# Patient Record
Sex: MALE | Race: WHITE | NOT HISPANIC OR LATINO | URBAN - METROPOLITAN AREA
[De-identification: names, ages, dates, MRNs, and addresses within clinical notes are randomized per-mention and may not be internally consistent; named-entity substitution may affect disease eponyms.]

---

## 2021-06-23 ENCOUNTER — APPOINTMENT (RX ONLY)
Dept: URBAN - METROPOLITAN AREA CLINIC 347 | Facility: CLINIC | Age: 41
Setting detail: DERMATOLOGY
End: 2021-06-23

## 2021-06-23 DIAGNOSIS — L81.4 OTHER MELANIN HYPERPIGMENTATION: ICD-10-CM

## 2021-06-23 DIAGNOSIS — D485 NEOPLASM OF UNCERTAIN BEHAVIOR OF SKIN: ICD-10-CM

## 2021-06-23 DIAGNOSIS — B35.1 TINEA UNGUIUM: ICD-10-CM

## 2021-06-23 DIAGNOSIS — D22 MELANOCYTIC NEVI: ICD-10-CM

## 2021-06-23 DIAGNOSIS — L82.1 OTHER SEBORRHEIC KERATOSIS: ICD-10-CM

## 2021-06-23 DIAGNOSIS — B35.3 TINEA PEDIS: ICD-10-CM

## 2021-06-23 DIAGNOSIS — L65.9 NONSCARRING HAIR LOSS, UNSPECIFIED: ICD-10-CM

## 2021-06-23 PROBLEM — D22.5 MELANOCYTIC NEVI OF TRUNK: Status: ACTIVE | Noted: 2021-06-23

## 2021-06-23 PROBLEM — D48.5 NEOPLASM OF UNCERTAIN BEHAVIOR OF SKIN: Status: ACTIVE | Noted: 2021-06-23

## 2021-06-23 PROCEDURE — ? COUNSELING

## 2021-06-23 PROCEDURE — ? PRESCRIPTION

## 2021-06-23 PROCEDURE — 99203 OFFICE O/P NEW LOW 30 MIN: CPT | Mod: 25

## 2021-06-23 PROCEDURE — ? TREATMENT REGIMEN

## 2021-06-23 PROCEDURE — ? OTHER

## 2021-06-23 PROCEDURE — 11102 TANGNTL BX SKIN SINGLE LES: CPT

## 2021-06-23 PROCEDURE — ? BIOPSY BY SHAVE METHOD

## 2021-06-23 RX ORDER — EFINACONAZOLE 100 MG/ML
SOLUTION TOPICAL
Qty: 1 | Refills: 4 | Status: ERX | COMMUNITY
Start: 2021-06-23

## 2021-06-23 RX ORDER — KETOCONAZOLE 20 MG/G
CREAM TOPICAL
Qty: 1 | Refills: 1 | Status: ERX | COMMUNITY
Start: 2021-06-23

## 2021-06-23 RX ADMIN — KETOCONAZOLE: 20 CREAM TOPICAL at 00:00

## 2021-06-23 RX ADMIN — EFINACONAZOLE: 100 SOLUTION TOPICAL at 00:00

## 2021-06-23 ASSESSMENT — LOCATION ZONE DERM
LOCATION ZONE: TOENAIL
LOCATION ZONE: TRUNK
LOCATION ZONE: FEET
LOCATION ZONE: SCALP
LOCATION ZONE: NECK

## 2021-06-23 ASSESSMENT — LOCATION DETAILED DESCRIPTION DERM
LOCATION DETAILED: LEFT 3RD TOENAIL
LOCATION DETAILED: LEFT 5TH TOENAIL
LOCATION DETAILED: LEFT PLANTAR FOREFOOT OVERLYING 3RD METATARSAL
LOCATION DETAILED: MID TRAPEZIAL NECK
LOCATION DETAILED: RIGHT MEDIAL UPPER BACK
LOCATION DETAILED: LEFT 2ND TOENAIL
LOCATION DETAILED: LEFT 4TH TOENAIL
LOCATION DETAILED: LEFT MEDIAL FRONTAL SCALP
LOCATION DETAILED: RIGHT SUPERIOR MEDIAL UPPER BACK
LOCATION DETAILED: RIGHT PLANTAR FOREFOOT OVERLYING 2ND METATARSAL
LOCATION DETAILED: RIGHT 3RD TOENAIL
LOCATION DETAILED: LEFT GREAT TOENAIL
LOCATION DETAILED: INFERIOR THORACIC SPINE

## 2021-06-23 ASSESSMENT — LOCATION SIMPLE DESCRIPTION DERM
LOCATION SIMPLE: RIGHT UPPER BACK
LOCATION SIMPLE: RIGHT 3RD TOE
LOCATION SIMPLE: TRAPEZIAL NECK
LOCATION SIMPLE: LEFT GREAT TOE
LOCATION SIMPLE: LEFT PLANTAR SURFACE
LOCATION SIMPLE: UPPER BACK
LOCATION SIMPLE: LEFT 4TH TOE
LOCATION SIMPLE: LEFT SCALP
LOCATION SIMPLE: LEFT 5TH TOE
LOCATION SIMPLE: LEFT 2ND TOE
LOCATION SIMPLE: RIGHT PLANTAR SURFACE
LOCATION SIMPLE: LEFT 3RD TOE

## 2021-06-23 NOTE — PROCEDURE: OTHER
Note Text (......Xxx Chief Complaint.): This diagnosis correlates with the
Render Risk Assessment In Note?: no
Detail Level: Detailed
Other (Free Text): * Photo was taken after biopsy was taken for location purposes *

## 2021-06-23 NOTE — PROCEDURE: TREATMENT REGIMEN
Otc Regimen: Minoxidil- Use on scalp daily \\nHead & Shoulders shampoo or Dove Men’s care - Use to wash hair daily
Detail Level: Detailed
Initiate Treatment: Ketoconazole cream -Apply to affected areas twice daily x 4 weeks

## 2021-06-23 NOTE — PROCEDURE: BIOPSY BY SHAVE METHOD
Additional Anesthesia Volume In Cc (Will Not Render If 0): 0
Was A Bandage Applied: Yes
Validate That Anesthesia Is Not 0: No
Cryotherapy Text: The wound bed was treated with cryotherapy after the biopsy was performed.
Anesthesia Type: 1% lidocaine with epinephrine
Electrodesiccation And Curettage Text: The wound bed was treated with electrodesiccation and curettage after the biopsy was performed.
Silver Nitrate Text: The wound bed was treated with silver nitrate after the biopsy was performed.
Anesthesia Volume In Cc (Will Not Render If 0): 1
Biopsy Type: H and E
Notification Instructions: Patient will be notified of biopsy results. However, patient instructed to call the office if not contacted within 2 weeks.
Hemostasis: Electrocautery and Aluminum Chloride
Consent: Verbal consent was obtained and risks were reviewed including but not limited to scarring, infection, bleeding, scabbing, incomplete removal, nerve damage and allergy to anesthesia.
Path Notes (To The Dermatopathologist): Check margins
Billing Type: Third-Party Bill
Electrodesiccation Text: The wound bed was treated with electrodesiccation after the biopsy was performed.
Detail Level: Detailed
Wound Care: Aquaphor
Type Of Destruction Used: Curettage
Size Of Lesion In Cm: 0.2
Post-Care Instructions: I reviewed with the patient in detail post-care instructions. Patient is to keep the biopsy site dry overnight, and then apply aquaphor once daily until healed. Patient may apply hydrogen peroxide soaks to remove any crusting.
Dressing: Band-Aid
Depth Of Biopsy: dermis
Information: Selecting Yes will display possible errors in your note based on the variables you have selected. This validation is only offered as a suggestion for you. PLEASE NOTE THAT THE VALIDATION TEXT WILL BE REMOVED WHEN YOU FINALIZE YOUR NOTE. IF YOU WANT TO FAX A PRELIMINARY NOTE YOU WILL NEED TO TOGGLE THIS TO 'NO' IF YOU DO NOT WANT IT IN YOUR FAXED NOTE.
Curettage Text: The wound bed was treated with curettage after the biopsy was performed.
Biopsy Method: Dermablade

## 2021-07-14 ENCOUNTER — APPOINTMENT (RX ONLY)
Dept: URBAN - METROPOLITAN AREA CLINIC 347 | Facility: CLINIC | Age: 41
Setting detail: DERMATOLOGY
End: 2021-07-14

## 2021-07-14 DIAGNOSIS — D22 MELANOCYTIC NEVI: ICD-10-CM

## 2021-07-14 PROBLEM — D22.4 MELANOCYTIC NEVI OF SCALP AND NECK: Status: ACTIVE | Noted: 2021-07-14

## 2021-07-14 PROCEDURE — ? COUNSELING

## 2021-07-14 PROCEDURE — ? CONSULTATION EXCISION

## 2021-07-14 PROCEDURE — ? REASON FOR TELEMEDICINE VISIT

## 2021-07-14 PROCEDURE — ? OTHER

## 2021-07-14 PROCEDURE — 99212 OFFICE O/P EST SF 10 MIN: CPT | Mod: 95

## 2021-07-14 PROCEDURE — ? PATHOLOGY DISCUSSION

## 2021-07-14 PROCEDURE — ? CONSENT FOR TELEMEDICINE VISIT OBTAINED

## 2021-07-14 ASSESSMENT — LOCATION SIMPLE DESCRIPTION DERM: LOCATION SIMPLE: TRAPEZIAL NECK

## 2021-07-14 ASSESSMENT — LOCATION ZONE DERM: LOCATION ZONE: NECK

## 2021-07-14 ASSESSMENT — LOCATION DETAILED DESCRIPTION DERM: LOCATION DETAILED: MID TRAPEZIAL NECK

## 2021-07-14 NOTE — PROCEDURE: OTHER
Detail Level: Detailed
Note Text (......Xxx Chief Complaint.): This diagnosis correlates with the
Other (Free Text): Patient will have it taken care of at another practice since the patient has moved and is not in this area anymore
Render Risk Assessment In Note?: no